# Patient Record
Sex: MALE | Race: OTHER | HISPANIC OR LATINO | URBAN - METROPOLITAN AREA
[De-identification: names, ages, dates, MRNs, and addresses within clinical notes are randomized per-mention and may not be internally consistent; named-entity substitution may affect disease eponyms.]

---

## 2017-07-13 ENCOUNTER — EMERGENCY (EMERGENCY)
Facility: HOSPITAL | Age: 27
LOS: 1 days | Discharge: HOME | End: 2017-07-13
Attending: EMERGENCY MEDICINE | Admitting: EMERGENCY MEDICINE
Payer: OTHER MISCELLANEOUS

## 2017-07-13 VITALS
OXYGEN SATURATION: 99 % | DIASTOLIC BLOOD PRESSURE: 80 MMHG | SYSTOLIC BLOOD PRESSURE: 129 MMHG | RESPIRATION RATE: 18 BRPM | HEART RATE: 66 BPM | TEMPERATURE: 98 F

## 2017-07-13 VITALS
SYSTOLIC BLOOD PRESSURE: 141 MMHG | HEART RATE: 79 BPM | TEMPERATURE: 98 F | DIASTOLIC BLOOD PRESSURE: 71 MMHG | OXYGEN SATURATION: 100 % | RESPIRATION RATE: 16 BRPM
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
## 2017-07-13 DIAGNOSIS — S61.213A LACERATION WITHOUT FOREIGN BODY OF LEFT MIDDLE FINGER WITHOUT DAMAGE TO NAIL, INITIAL ENCOUNTER: ICD-10-CM
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
## 2017-07-13 DIAGNOSIS — Z23 ENCOUNTER FOR IMMUNIZATION: ICD-10-CM
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
## 2017-07-13 PROCEDURE — 12001 RPR S/N/AX/GEN/TRNK 2.5CM/<: CPT

## 2017-07-13 PROCEDURE — 99284 EMERGENCY DEPT VISIT MOD MDM: CPT | Mod: 25

## 2017-07-13 PROCEDURE — 73140 X-RAY EXAM OF FINGER(S): CPT | Mod: 26,LT

## 2017-07-13 RX ORDER — CEPHALEXIN 500 MG
500 CAPSULE ORAL ONCE
Qty: 0 | Refills: 0 | Status: COMPLETED | OUTPATIENT
Start: 2017-07-13 | End: 2017-07-13

## 2017-07-13 RX ORDER — CEPHALEXIN 500 MG
1 CAPSULE ORAL
Qty: 6 | Refills: 0 | OUTPATIENT
Start: 2017-07-13 | End: 2017-07-19

## 2017-07-13 RX ORDER — IBUPROFEN 200 MG
600 TABLET ORAL ONCE
Qty: 0 | Refills: 0 | Status: COMPLETED | OUTPATIENT
Start: 2017-07-13 | End: 2017-07-13

## 2017-07-13 RX ORDER — TETANUS TOXOID, REDUCED DIPHTHERIA TOXOID AND ACELLULAR PERTUSSIS VACCINE, ADSORBED 5; 2.5; 8; 8; 2.5 [IU]/.5ML; [IU]/.5ML; UG/.5ML; UG/.5ML; UG/.5ML
0.5 SUSPENSION INTRAMUSCULAR ONCE
Qty: 0 | Refills: 0 | Status: COMPLETED | OUTPATIENT
Start: 2017-07-13 | End: 2017-07-13

## 2017-07-13 RX ADMIN — Medication 600 MILLIGRAM(S): at 13:36

## 2017-07-13 RX ADMIN — Medication 500 MILLIGRAM(S): at 13:23

## 2017-07-13 RX ADMIN — TETANUS TOXOID, REDUCED DIPHTHERIA TOXOID AND ACELLULAR PERTUSSIS VACCINE, ADSORBED 0.5 MILLILITER(S): 5; 2.5; 8; 8; 2.5 SUSPENSION INTRAMUSCULAR at 13:24

## 2017-07-13 RX ADMIN — Medication 600 MILLIGRAM(S): at 12:29

## 2017-07-13 NOTE — ED PROVIDER NOTE - MUSCULOSKELETAL, MLM
Spine appears normal, range of motion is not limited, no muscle or joint tenderness. tenderness to distal tip of 3rd digit.

## 2017-07-13 NOTE — ED PROCEDURE NOTE - ATTENDING CONTRIBUTION TO CARE
partial avulsion of distal L 3rd digit, no exposed bone, repaired using distal skin flap as biopatch, informed patient distal skin flap may necrose, give rx for keflex, follow up with Hand surgery within 1 week

## 2017-07-13 NOTE — ED PROVIDER NOTE - MEDICAL DECISION MAKING DETAILS
26 y/o male w/ laceration to tip of finger. xray to eval for involvement of bone, sutured laceration, ppx abx, to have sutures removed in 7 days.

## 2017-07-13 NOTE — ED ADULT TRIAGE NOTE - CHIEF COMPLAINT QUOTE
Pt cut his left 3rd digit with a knife at work Pt cut his finger at work with a knife, laceration noted to distal portion of left 3rd digit including nail, bleeding controlled

## 2017-07-13 NOTE — ED PROVIDER NOTE - ATTENDING CONTRIBUTION TO CARE
partial avulsion of distal L 3rd digit with knife, no exposed bone, repaired using distal skin flap as biopatch, tetanus updated, informed patient distal skin flap may necrose, return precautions, give rx for keflex, follow up with Hand surgery within 1 week

## 2017-07-13 NOTE — ED PROVIDER NOTE - OBJECTIVE STATEMENT
26 y/o male w/ no pmh p/w laceration to hand. He was at work chopping vegetables when he sliced the tip of his finger. Currently c/o pain to digit. Last tetanus shot unknown.

## 2017-07-13 NOTE — ED ADULT NURSE NOTE - CHIEF COMPLAINT QUOTE
Pt cut his finger at work with a knife, laceration noted to distal portion of left 3rd digit including nail, bleeding controlled

## 2017-07-13 NOTE — ED PROCEDURE NOTE - CPROC ED POST PROC CARE GUIDE1
Instructed patient/caregiver regarding signs and symptoms of infection./Verbal/written post procedure instructions were given to patient/caregiver./Instructed patient/caregiver to follow-up with primary care physician. Instructed patient/caregiver regarding signs and symptoms of infection./Verbal/written post procedure instructions were given to patient/caregiver./Keep the cast/splint/dressing clean and dry./Instructed patient/caregiver to follow-up with primary care physician.

## 2017-11-05 NOTE — ED PROCEDURE NOTE - CPROC ED INFORMED CONSENT1
Benefits, risks, and possible complications of procedure explained to patient/caregiver who verbalized understanding and gave verbal consent. there is no prior EKG available for comparison

## 2024-10-25 NOTE — ED PROVIDER NOTE - DRAINAGE COLOR
sanguineous
Yan Howell  Orthopaedic Surgery  [address]  [place] [address]  Phone: [phone]  Fax: [phone]  Follow Up Time:     Neo Vieyra  Orthopaedic Surgery  [address]  [place] [address]  Phone: [phone]  Fax: [phone]  Follow Up Time:
